# Patient Record
Sex: MALE | Race: WHITE | NOT HISPANIC OR LATINO | Employment: FULL TIME | URBAN - METROPOLITAN AREA
[De-identification: names, ages, dates, MRNs, and addresses within clinical notes are randomized per-mention and may not be internally consistent; named-entity substitution may affect disease eponyms.]

---

## 2022-08-30 ENCOUNTER — APPOINTMENT (OUTPATIENT)
Dept: RADIOLOGY | Facility: HOSPITAL | Age: 37
End: 2022-08-30
Payer: COMMERCIAL

## 2022-08-30 ENCOUNTER — HOSPITAL ENCOUNTER (EMERGENCY)
Facility: HOSPITAL | Age: 37
Discharge: HOME | End: 2022-08-30
Attending: EMERGENCY MEDICINE
Payer: COMMERCIAL

## 2022-08-30 VITALS
OXYGEN SATURATION: 96 % | RESPIRATION RATE: 16 BRPM | HEART RATE: 70 BPM | SYSTOLIC BLOOD PRESSURE: 162 MMHG | TEMPERATURE: 97.1 F | DIASTOLIC BLOOD PRESSURE: 75 MMHG

## 2022-08-30 DIAGNOSIS — S62.309A METACARPAL BONE FRACTURE: Primary | ICD-10-CM

## 2022-08-30 PROCEDURE — 99283 EMERGENCY DEPT VISIT LOW MDM: CPT

## 2022-08-30 PROCEDURE — 29105 APPLICATION LONG ARM SPLINT: CPT | Performed by: EMERGENCY MEDICINE

## 2022-08-30 PROCEDURE — 99284 EMERGENCY DEPT VISIT MOD MDM: CPT | Performed by: EMERGENCY MEDICINE

## 2022-08-30 PROCEDURE — 73130 X-RAY EXAM OF HAND: CPT

## 2022-08-30 RX ORDER — NAPROXEN 500 MG/1
500 TABLET ORAL 2 TIMES DAILY WITH MEALS
Qty: 20 TABLET | Refills: 0 | Status: SHIPPED | OUTPATIENT
Start: 2022-08-30

## 2022-08-30 NOTE — ED PROVIDER NOTES
History  Chief Complaint   Patient presents with    Hand Injury     [facility], rejected by RN for swollen left hand, got into argument and hit wall a week and a half ago, has been taking advil at home for pain, swelling to left hand and knuckles,     51-year-old male presents to the ED under police custody for medical clearance  Patient is currently under arrest   Patient states that he is having left hand pain over the past 1 5 weeks  Patient states that he punched a brick wall and has had pain since that time  Patient is left-hand dominant  Patient is able to move all of his fingers  Patient has trouble making  secondary to pain  Patient denies any paresthesia or numbness  History provided by:  Patient  Hand Injury  Associated symptoms: no back pain, no fatigue and no fever        None       History reviewed  No pertinent past medical history  History reviewed  No pertinent surgical history  History reviewed  No pertinent family history  I have reviewed and agree with the history as documented  E-Cigarette/Vaping     E-Cigarette/Vaping Substances     Social History     Tobacco Use    Smoking status: Current Every Day Smoker    Smokeless tobacco: Never Used   Substance Use Topics    Drug use: Not Currently       Review of Systems   Constitutional: Negative for activity change, fatigue and fever  HENT: Negative for congestion, ear discharge and sore throat  Eyes: Negative for pain and redness  Respiratory: Negative for cough, chest tightness, shortness of breath and wheezing  Cardiovascular: Negative for chest pain  Gastrointestinal: Negative for abdominal pain, diarrhea, nausea and vomiting  Endocrine: Negative for cold intolerance  Genitourinary: Negative for dysuria and urgency  Musculoskeletal: Positive for arthralgias  Negative for back pain  Neurological: Negative for dizziness, weakness and headaches     Psychiatric/Behavioral: Negative for agitation and behavioral problems  Physical Exam  Physical Exam  Vitals and nursing note reviewed  Constitutional:       Appearance: He is well-developed  HENT:      Head: Normocephalic and atraumatic  Nose: Nose normal    Eyes:      Conjunctiva/sclera: Conjunctivae normal    Cardiovascular:      Rate and Rhythm: Normal rate and regular rhythm  Heart sounds: Normal heart sounds  Pulmonary:      Effort: Pulmonary effort is normal       Breath sounds: Normal breath sounds  Abdominal:      General: Bowel sounds are normal  There is no distension  Palpations: Abdomen is soft  Tenderness: There is no abdominal tenderness  Musculoskeletal:         General: Normal range of motion  Cervical back: Normal range of motion and neck supple  Comments: Pulses intact bilateral upper extremities  Mild tenderness and edema noted to the lateral aspect of left hand  Range of motions of fingers are intact to the left hand  Skin:     General: Skin is warm  Neurological:      General: No focal deficit present  Mental Status: He is alert and oriented to person, place, and time  Psychiatric:         Mood and Affect: Mood normal          Behavior: Behavior normal          Thought Content:  Thought content normal          Judgment: Judgment normal          Vital Signs  ED Triage Vitals   Temperature Pulse Respirations Blood Pressure SpO2   08/30/22 0111 08/30/22 0107 08/30/22 0107 08/30/22 0107 08/30/22 0107   (!) 97 1 °F (36 2 °C) 70 16 162/75 96 %      Temp Source Heart Rate Source Patient Position - Orthostatic VS BP Location FiO2 (%)   08/30/22 0111 08/30/22 0107 08/30/22 0107 08/30/22 0107 --   Tympanic Monitor Sitting Left arm       Pain Score       --                  Vitals:    08/30/22 0107   BP: 162/75   Pulse: 70   Patient Position - Orthostatic VS: Sitting         Visual Acuity      ED Medications  Medications - No data to display    Diagnostic Studies  Results Reviewed None                 XR hand 3+ views LEFT   Final Result by Adam Trent DO (08/30 5466)      Mildly impacted fracture proximal 5th metacarpal          The study was marked in EPIC for immediate notification  Workstation performed: [id number]                    Procedures  Orthopedic injury treatment    Date/Time: 8/30/2022 3:00 AM  Performed by: Adam Larson DO  Authorized by: Adam Larson DO     Patient Location:  ED  [place] Protocol:  Procedure performed by: (ED nurse)  Consent: Verbal consent obtained    Risks and benefits: risks, benefits and alternatives were discussed  Consent given by: patient  Patient identity confirmed: verbally with patient      Injury location:  Hand  Location details:  Left hand  Injury type:  Fracture  Fracture type: fifth metacarpal    Neurovascular status: Neurovascularly intact    Distal perfusion: normal    Neurological function: normal    Range of motion: normal    Local anesthesia used?: No    General anesthesia used?: No    Manipulation performed?: No    Immobilization:  Splint  Splint type:  Ulnar gutter  Neurovascular status: Neurovascularly intact    Distal perfusion: normal    Neurological function: normal    Range of motion: normal    Patient tolerance:  Patient tolerated the procedure well with no immediate complications             ED Course                                             MDM  Number of Diagnoses or Management Options  Metacarpal bone fracture: new and requires workup  Diagnosis management comments: Obtain x-ray of left hand       Amount and/or Complexity of Data Reviewed  Tests in the radiology section of CPT®: ordered and reviewed  Tests in the medicine section of CPT®: ordered and reviewed  Review and summarize past medical records: yes  Independent visualization of images, tracings, or specimens: yes    Risk of Complications, Morbidity, and/or Mortality  General comments: X-ray showed Mildly impacted fracture proximal 5th metacarpal  Ulnar gutter splint placed at bedside and patient is discharged back to police custody with follow-up to orthopedic surgery for further evaluation and management  Close return instructions given to return to the ER for any worsening symptoms  Patient agrees with discharge plan  Patient well appearing at time of discharge  Please Note: Fluency Direct voice recognition software may have been used in the creation of this document  Wrong words or sound a like substitutions may have occurred due to the inherent limitations of the voice software  Patient Progress  Patient progress: stable      Disposition  Final diagnoses:   Metacarpal bone fracture     Time reflects when diagnosis was documented in both MDM as applicable and the Disposition within this note     Time User Action Codes Description Comment    8/30/2022  2:36 AM Tomasaan Frankel Add [[id number]] Metacarpal bone fracture       ED Disposition     ED Disposition   Discharge    Condition   Stable    Date/Time   Tue Aug 30, 2022  3:12 AM    Jasmeet Ferguson discharge to home/self care  Follow-up Information     Follow up With Specialties Details Why Contact Info    Karla Spaulding MD Orthopedic Surgery Schedule an appointment as soon as possible for a visit   [address] [place] [address]  [phone]            Patient's Medications   Discharge Prescriptions    NAPROXEN (NAPROSYN) 500 MG TABLET    Take 1 tablet (500 mg total) by mouth 2 (two) times a day with meals       Start Date: 8/30/2022 End Date: --       Order Dose: 500 mg       Quantity: 20 tablet    Refills: 0       No discharge procedures on file      PDMP Review     None          ED Provider  Electronically Signed by           Gee Kemp DO  08/30/22 7129

## 2022-08-30 NOTE — Clinical Note
Simone Reggieia was seen and treated in our emergency department on 8/30/2022  No heavy lifting at work  Diagnosis: Hand fracture    Trent       He may return on this date: If you have any questions or concerns, please don't hesitate to call        Clarissa Roberson RN    ______________________________           _______________          _______________  Hospital Representative                              Date                                Time

## 2022-08-30 NOTE — Clinical Note
Barb Robb was seen and treated in our emergency department on 8/30/2022  No heavy lifting at work  Diagnosis: Hand fracture    Trent       He may return on this date: If you have any questions or concerns, please don't hesitate to call        Ellen Machado RN    ______________________________           _______________          _______________  Hospital Representative                              Date                                Time

## 2022-08-30 NOTE — DISCHARGE INSTRUCTIONS
Patient is medically cleared for [facility]  Please take a list of all of your medications and discharge paperwork with you to all of your follow-up medical visits  Please take all of your medications as directed  Please call your family doctor or return to the ER if you have increased shortness of breath, chest pain, fevers, chills, nausea, vomiting, diarrhea, or any other worsening symptoms